# Patient Record
Sex: FEMALE | Race: WHITE | NOT HISPANIC OR LATINO
[De-identification: names, ages, dates, MRNs, and addresses within clinical notes are randomized per-mention and may not be internally consistent; named-entity substitution may affect disease eponyms.]

---

## 2017-05-18 ENCOUNTER — TRANSCRIPTION ENCOUNTER (OUTPATIENT)
Age: 52
End: 2017-05-18

## 2017-06-30 ENCOUNTER — TRANSCRIPTION ENCOUNTER (OUTPATIENT)
Age: 52
End: 2017-06-30

## 2019-04-22 ENCOUNTER — TRANSCRIPTION ENCOUNTER (OUTPATIENT)
Age: 54
End: 2019-04-22

## 2019-07-02 ENCOUNTER — APPOINTMENT (OUTPATIENT)
Dept: UROLOGY | Facility: CLINIC | Age: 54
End: 2019-07-02
Payer: COMMERCIAL

## 2019-07-02 VITALS
SYSTOLIC BLOOD PRESSURE: 130 MMHG | WEIGHT: 285 LBS | DIASTOLIC BLOOD PRESSURE: 81 MMHG | HEIGHT: 66 IN | BODY MASS INDEX: 45.8 KG/M2 | HEART RATE: 91 BPM

## 2019-07-02 PROBLEM — Z00.00 ENCOUNTER FOR PREVENTIVE HEALTH EXAMINATION: Status: ACTIVE | Noted: 2019-07-02

## 2019-07-02 PROCEDURE — 99203 OFFICE O/P NEW LOW 30 MIN: CPT

## 2019-07-02 NOTE — ASSESSMENT
[FreeTextEntry1] : This is an initial visit for this 54-year-old patient who recently on a trip to her son in [facility] developed renal colic and was seen in the emergency room\par She was told that there was no stone despite the severe pain and it was not an emergency visit\par They called back the next to say she indeed had a stone and she has done well but recently had a left-sided attack of some pain 2 weeks after the initial at\par  I. have asked her to obtain a to CAT scan report and a CAT scan itself and will sends aprescription for Toradol to her pharmacy in case there's a repeat attack in addition to her she has urgency and will give her Detrol 4 mg

## 2019-07-02 NOTE — PHYSICAL EXAM
[General Appearance - Well Developed] : well developed [General Appearance - Well Nourished] : well nourished [Normal Appearance] : normal appearance [Well Groomed] : well groomed [Edema] : no peripheral edema [General Appearance - In No Acute Distress] : no acute distress [Respiration, Rhythm And Depth] : normal respiratory rhythm and effort [Abdomen Soft] : soft [Exaggerated Use Of Accessory Muscles For Inspiration] : no accessory muscle use [Abdomen Tenderness] : non-tender [Costovertebral Angle Tenderness] : no ~M costovertebral angle tenderness [Urinary Bladder Findings] : the bladder was normal on palpation [Normal Station and Gait] : the gait and station were normal for the patient's age [No Focal Deficits] : no focal deficits [] : no rash [Affect] : the affect was normal [Oriented To Time, Place, And Person] : oriented to person, place, and time [Mood] : the mood was normal [Not Anxious] : not anxious [No Palpable Adenopathy] : no palpable adenopathy

## 2019-07-10 ENCOUNTER — RX RENEWAL (OUTPATIENT)
Age: 54
End: 2019-07-10

## 2019-07-10 ENCOUNTER — MESSAGE (OUTPATIENT)
Age: 54
End: 2019-07-10

## 2019-08-21 ENCOUNTER — RX CHANGE (OUTPATIENT)
Age: 54
End: 2019-08-21

## 2019-09-03 ENCOUNTER — APPOINTMENT (OUTPATIENT)
Dept: UROLOGY | Facility: CLINIC | Age: 54
End: 2019-09-03
Payer: COMMERCIAL

## 2019-09-03 VITALS
HEIGHT: 66 IN | BODY MASS INDEX: 45.8 KG/M2 | DIASTOLIC BLOOD PRESSURE: 97 MMHG | SYSTOLIC BLOOD PRESSURE: 154 MMHG | WEIGHT: 285 LBS | HEART RATE: 76 BPM

## 2019-09-03 DIAGNOSIS — Z80.3 FAMILY HISTORY OF MALIGNANT NEOPLASM OF BREAST: ICD-10-CM

## 2019-09-03 DIAGNOSIS — Z80.42 FAMILY HISTORY OF MALIGNANT NEOPLASM OF PROSTATE: ICD-10-CM

## 2019-09-03 PROCEDURE — 99213 OFFICE O/P EST LOW 20 MIN: CPT

## 2019-09-03 RX ORDER — TOLTERODINE TARTARATE 2 MG/1
2 TABLET ORAL
Qty: 30 | Refills: 0 | Status: DISCONTINUED | COMMUNITY
Start: 2019-07-10 | End: 2019-09-03

## 2019-09-03 RX ORDER — TOLTERODINE TARTRATE 4 MG/1
4 CAPSULE, EXTENDED RELEASE ORAL
Qty: 90 | Refills: 6 | Status: DISCONTINUED | COMMUNITY
Start: 2019-08-21 | End: 2019-09-03

## 2019-09-03 RX ORDER — TOLTERODINE TARTRATE 4 MG/1
4 CAPSULE, EXTENDED RELEASE ORAL
Qty: 30 | Refills: 0 | Status: DISCONTINUED | COMMUNITY
Start: 2019-07-02 | End: 2019-09-03

## 2019-09-03 NOTE — ASSESSMENT
[FreeTextEntry1] : This is a followup visit for this 54-year-old patient with history of kidney stones and who developed the left colic while taking her son to [facility] in [place]\par She had a CT scan they told her she had a 2 mm stone she didn't quite S. input pain his back associated with low-grade fever and diarrhea\par We'll get a urine culture to rule out UTI as the cause of the fever but the patient says that the last time she had kidney surgery at the same complex of symptoms with diarrhea and low-grade fever

## 2019-09-05 ENCOUNTER — MESSAGE (OUTPATIENT)
Age: 54
End: 2019-09-05

## 2019-09-18 ENCOUNTER — APPOINTMENT (OUTPATIENT)
Dept: UROLOGY | Facility: CLINIC | Age: 54
End: 2019-09-18
Payer: COMMERCIAL

## 2019-09-18 VITALS
BODY MASS INDEX: 45.8 KG/M2 | HEART RATE: 90 BPM | DIASTOLIC BLOOD PRESSURE: 92 MMHG | SYSTOLIC BLOOD PRESSURE: 126 MMHG | WEIGHT: 285 LBS | HEIGHT: 66 IN

## 2019-09-18 PROCEDURE — 76775 US EXAM ABDO BACK WALL LIM: CPT

## 2019-09-18 PROCEDURE — 99214 OFFICE O/P EST MOD 30 MIN: CPT | Mod: 25

## 2019-09-18 NOTE — PHYSICAL EXAM
[General Appearance - Well Developed] : well developed [General Appearance - Well Nourished] : well nourished [Well Groomed] : well groomed [Normal Appearance] : normal appearance [General Appearance - In No Acute Distress] : no acute distress [Abdomen Soft] : soft [Costovertebral Angle Tenderness] : no ~M costovertebral angle tenderness [Urinary Bladder Findings] : the bladder was normal on palpation [Abdomen Tenderness] : non-tender [] : no respiratory distress [Edema] : no peripheral edema [Respiration, Rhythm And Depth] : normal respiratory rhythm and effort [Exaggerated Use Of Accessory Muscles For Inspiration] : no accessory muscle use [Oriented To Time, Place, And Person] : oriented to person, place, and time [Affect] : the affect was normal [Mood] : the mood was normal [Not Anxious] : not anxious [Normal Station and Gait] : the gait and station were normal for the patient's age [No Focal Deficits] : no focal deficits [No Palpable Adenopathy] : no palpable adenopathy

## 2019-09-24 ENCOUNTER — APPOINTMENT (OUTPATIENT)
Dept: UROLOGY | Facility: CLINIC | Age: 54
End: 2019-09-24
Payer: COMMERCIAL

## 2019-09-24 VITALS
HEIGHT: 66 IN | HEART RATE: 81 BPM | SYSTOLIC BLOOD PRESSURE: 133 MMHG | BODY MASS INDEX: 45.8 KG/M2 | DIASTOLIC BLOOD PRESSURE: 90 MMHG | WEIGHT: 285 LBS

## 2019-09-24 PROCEDURE — 52000 CYSTOURETHROSCOPY: CPT

## 2019-09-24 PROCEDURE — 99212 OFFICE O/P EST SF 10 MIN: CPT | Mod: 25

## 2019-09-24 NOTE — ASSESSMENT
[FreeTextEntry1] : This is a followup this 54-year-old patient who developed left flank pain while visiting her son in [place]\par She had an episode of renal clock was seen in the emergency room where CT was done which was cultured a left-sided kidney stone\par On review we agreed that is suggestive of a stone but it was his study with oral contrast making slightly less than optimal she's been symptomatic off and on\par Today she presents with severe colicky attack last night in that she had to urinate all the time and was unable to accept in small amounts she then was perfectly fine until the same thing happened again this\par Voided urine shows microhematuria no evidence of infection\par A renal ultrasound was done showing no hydronephrosis\par I think the presumptive diagnosis is a intramural stone on the left side and we'll get a CAT scan done urgently to assess this hypothesis\par

## 2019-09-24 NOTE — ADDENDUM
[FreeTextEntry1] : PVR\par A transabdominal ultrasound was done collecting images of the bladder in the transverse and longitudinal axis to check a postvoid residual\par Her postvoid residual was low about 40 cc

## 2019-09-30 ENCOUNTER — OTHER (OUTPATIENT)
Age: 54
End: 2019-09-30

## 2019-09-30 ENCOUNTER — APPOINTMENT (OUTPATIENT)
Dept: UROLOGY | Facility: HOSPITAL | Age: 54
End: 2019-09-30

## 2019-10-01 ENCOUNTER — TRANSCRIPTION ENCOUNTER (OUTPATIENT)
Age: 54
End: 2019-10-01

## 2021-08-31 RX ORDER — TOLTERODINE TARTARATE 2 MG/1
2 TABLET ORAL
Qty: 90 | Refills: 3 | Status: DISCONTINUED | COMMUNITY
Start: 2019-08-21 | End: 2021-08-31

## 2021-09-05 ENCOUNTER — TRANSCRIPTION ENCOUNTER (OUTPATIENT)
Age: 56
End: 2021-09-05

## 2021-10-19 ENCOUNTER — APPOINTMENT (OUTPATIENT)
Dept: UROLOGY | Facility: CLINIC | Age: 56
End: 2021-10-19
Payer: COMMERCIAL

## 2021-10-19 VITALS
TEMPERATURE: 97.8 F | RESPIRATION RATE: 18 BRPM | OXYGEN SATURATION: 99 % | SYSTOLIC BLOOD PRESSURE: 147 MMHG | DIASTOLIC BLOOD PRESSURE: 81 MMHG | HEART RATE: 76 BPM

## 2021-10-19 DIAGNOSIS — C95.90 LEUKEMIA, UNSPECIFIED NOT HAVING ACHIEVED REMISSION: ICD-10-CM

## 2021-10-19 PROCEDURE — 99213 OFFICE O/P EST LOW 20 MIN: CPT

## 2021-10-19 NOTE — ASSESSMENT
[FreeTextEntry1] : This is a followup visit for this 56 patient has recurrent nephrolithiasis who is been symptom-free for quite a while but she also suffers from urinary frequency which has been well managed with tolterodine 2 mg\par Avoid urine is clear\par There is no suggestion of infection or or stone therefore simply won't renew her tolterodine and give her routine follow up

## 2022-08-22 RX ORDER — TOLTERODINE TARTRATE 2 MG/1
2 CAPSULE, EXTENDED RELEASE ORAL
Qty: 90 | Refills: 0 | Status: ACTIVE | COMMUNITY
Start: 2019-08-21 | End: 1900-01-01

## 2023-03-13 ENCOUNTER — OFFICE (OUTPATIENT)
Dept: URBAN - METROPOLITAN AREA CLINIC 122 | Facility: CLINIC | Age: 58
Setting detail: OPHTHALMOLOGY
End: 2023-03-13
Payer: COMMERCIAL

## 2023-03-13 DIAGNOSIS — H35.033: ICD-10-CM

## 2023-03-13 DIAGNOSIS — H01.001: ICD-10-CM

## 2023-03-13 DIAGNOSIS — H35.3122: ICD-10-CM

## 2023-03-13 DIAGNOSIS — H16.223: ICD-10-CM

## 2023-03-13 DIAGNOSIS — H35.3111: ICD-10-CM

## 2023-03-13 DIAGNOSIS — H01.004: ICD-10-CM

## 2023-03-13 DIAGNOSIS — H25.13: ICD-10-CM

## 2023-03-13 PROCEDURE — 92134 CPTRZ OPH DX IMG PST SGM RTA: CPT | Performed by: OPHTHALMOLOGY

## 2023-03-13 PROCEDURE — 92014 COMPRE OPH EXAM EST PT 1/>: CPT | Performed by: OPHTHALMOLOGY

## 2023-03-13 ASSESSMENT — SPHEQUIV_DERIVED
OD_SPHEQUIV: -5.25
OS_SPHEQUIV: -6.25
OD_SPHEQUIV: -5.25
OS_SPHEQUIV: -5.75

## 2023-03-13 ASSESSMENT — REFRACTION_CURRENTRX
OD_SPHERE: -4.50
OD_CYLINDER: SPH
OD_OVR_VA: 20/
OS_SPHERE: -4.75
OD_VPRISM_DIRECTION: SV
OS_OVR_VA: 20/
OS_CYLINDER: SPH
OS_VPRISM_DIRECTION: SV

## 2023-03-13 ASSESSMENT — REFRACTION_AUTOREFRACTION
OD_SPHERE: -4.75
OS_CYLINDER: -1.00
OD_CYLINDER: -1.00
OS_SPHERE: -5.25
OD_AXIS: 102
OS_AXIS: 95

## 2023-03-13 ASSESSMENT — TONOMETRY
OD_IOP_MMHG: 15
OS_IOP_MMHG: 16

## 2023-03-13 ASSESSMENT — REFRACTION_MANIFEST
OD_VA1: 20/20
OD_SPHERE: -5.00
OD_SPHERE: PLANO
OS_SPHERE: -5.75
OS_SPHERE: +0.50
OS_AXIS: 95
OS_CYLINDER: -1.00
OD_CYLINDER: -0.50
OS_VA1: 20/25
OD_AXIS: 100
OD_VA1: 20/20
OS_VA1: 20/25

## 2023-03-13 ASSESSMENT — LID EXAM ASSESSMENTS
OD_BLEPHARITIS: RUL 1+
OS_BLEPHARITIS: LUL 1+

## 2023-03-13 ASSESSMENT — CONFRONTATIONAL VISUAL FIELD TEST (CVF)
OS_FINDINGS: FULL
OD_FINDINGS: FULL

## 2023-03-13 ASSESSMENT — VISUAL ACUITY
OS_BCVA: 20/20
OD_BCVA: 20/25+2

## 2023-03-13 ASSESSMENT — OVER_REFRACTION
OS_SPHERE: PLANO
OD_SPHERE: PLANO

## 2023-03-13 ASSESSMENT — TEAR BREAK UP TIME (TBUT)
OS_TBUT: 2+
OD_TBUT: 2+

## 2024-01-08 ENCOUNTER — OFFICE (OUTPATIENT)
Dept: URBAN - METROPOLITAN AREA CLINIC 122 | Facility: CLINIC | Age: 59
Setting detail: OPHTHALMOLOGY
End: 2024-01-08
Payer: COMMERCIAL

## 2024-01-08 DIAGNOSIS — H35.3122: ICD-10-CM

## 2024-01-08 DIAGNOSIS — H35.3111: ICD-10-CM

## 2024-01-08 DIAGNOSIS — H01.004: ICD-10-CM

## 2024-01-08 DIAGNOSIS — H35.033: ICD-10-CM

## 2024-01-08 DIAGNOSIS — H25.13: ICD-10-CM

## 2024-01-08 DIAGNOSIS — H16.223: ICD-10-CM

## 2024-01-08 DIAGNOSIS — H01.001: ICD-10-CM

## 2024-01-08 PROCEDURE — 92012 INTRM OPH EXAM EST PATIENT: CPT | Performed by: OPHTHALMOLOGY

## 2024-01-08 PROCEDURE — 92134 CPTRZ OPH DX IMG PST SGM RTA: CPT | Performed by: OPHTHALMOLOGY

## 2024-01-08 ASSESSMENT — REFRACTION_CURRENTRX
OS_VPRISM_DIRECTION: SV
OD_CYLINDER: SPH
OS_SPHERE: -4.75
OD_VPRISM_DIRECTION: SV
OD_SPHERE: -4.50
OS_OVR_VA: 20/
OD_OVR_VA: 20/
OS_CYLINDER: SPH

## 2024-01-08 ASSESSMENT — REFRACTION_MANIFEST
OS_VA1: 20/25
OD_AXIS: 100
OD_CYLINDER: -1.25
OD_VA1: 20/20
OS_AXIS: 95
OD_SPHERE: -5.00
OD_SPHERE: -4.25
OS_AXIS: 90
OD_VA1: 20/20
OD_VA1: 20/20
OS_SPHERE: -5.50
OD_AXIS: 95
OS_VA1: 20/25
OS_VA1: 20/25
OD_ADD: +2.50
OS_SPHERE: +0.50
OD_SPHERE: PLANO
OS_CYLINDER: -1.25
OS_CYLINDER: -1.00
OS_SPHERE: -5.75
OD_CYLINDER: -0.50
OS_ADD: +2.50

## 2024-01-08 ASSESSMENT — LID EXAM ASSESSMENTS
OD_BLEPHARITIS: RUL 1+
OS_BLEPHARITIS: LUL 1+

## 2024-01-08 ASSESSMENT — TEAR BREAK UP TIME (TBUT)
OD_TBUT: 2+
OS_TBUT: 2+

## 2024-01-08 ASSESSMENT — SPHEQUIV_DERIVED
OD_SPHEQUIV: -4.875
OD_SPHEQUIV: -5.25
OS_SPHEQUIV: -5.75
OS_SPHEQUIV: -6.25
OD_SPHEQUIV: -4.875
OS_SPHEQUIV: -6.125

## 2024-01-08 ASSESSMENT — OVER_REFRACTION
OD_VA1: 20/20
OS_SPHERE: PLANO
OS_VA1: 20/30+
OD_SPHERE: PLANO
OD_SPHERE: +0.50
OS_SPHERE: -0.25

## 2024-01-08 ASSESSMENT — REFRACTION_AUTOREFRACTION
OD_AXIS: 101
OS_SPHERE: -5.00
OS_CYLINDER: -1.50
OD_SPHERE: -4.25
OS_AXIS: 95
OD_CYLINDER: -1.25

## 2024-01-25 ENCOUNTER — OFFICE (OUTPATIENT)
Dept: URBAN - METROPOLITAN AREA CLINIC 122 | Facility: CLINIC | Age: 59
Setting detail: OPHTHALMOLOGY
End: 2024-01-25

## 2024-01-25 DIAGNOSIS — H52.7: ICD-10-CM

## 2024-01-25 PROCEDURE — CLEST CL LENS FIT ESTABLISHED WEARER FITTING ONLY

## 2024-01-25 ASSESSMENT — OVER_REFRACTION
OS_SPHERE: PLANO
OD_SPHERE: PLANO
OS_VA1: 20/30+
OD_VA1: 20/20
OS_SPHERE: -0.25
OD_SPHERE: +0.50

## 2024-01-25 ASSESSMENT — REFRACTION_AUTOREFRACTION
OD_SPHERE: -4.75
OD_CYLINDER: -1.25
OS_AXIS: 98
OD_AXIS: 100
OS_CYLINDER: -1.25
OS_SPHERE: -5.25

## 2024-01-25 ASSESSMENT — REFRACTION_CURRENTRX
OD_CYLINDER: SPH
OS_VPRISM_DIRECTION: SV
OD_OVR_VA: 20/
OD_SPHERE: -4.50
OS_SPHERE: -4.75
OS_CYLINDER: SPH
OS_OVR_VA: 20/
OD_VPRISM_DIRECTION: SV

## 2024-01-25 ASSESSMENT — REFRACTION_MANIFEST
OS_AXIS: 95
OS_VA1: 20/25
OD_CYLINDER: -1.25
OD_VA1: 20/20
OS_VA1: 20/25
OS_ADD: +2.50
OS_SPHERE: +0.50
OS_ADD: +2.50
OS_SPHERE: -5.75
OD_SPHERE: PLANO
OS_VA1: 20/20
OD_VA1: 20/20
OD_AXIS: 95
OS_AXIS: 100
OD_SPHERE: -4.25
OD_ADD: +2.50
OD_AXIS: 95
OD_AXIS: 100
OD_SPHERE: -4.50
OS_SPHERE: -5.75
OD_SPHERE: -5.00
OS_AXIS: 90
OD_CYLINDER: -0.50
OD_ADD: +2.50
OD_VA1: 20/20
OD_CYLINDER: -1.25
OS_CYLINDER: -1.25
OS_CYLINDER: -1.25
OS_VA1: 20/25
OS_CYLINDER: -1.00
OD_VA1: 20/20
OS_SPHERE: -5.50

## 2024-01-25 ASSESSMENT — SPHEQUIV_DERIVED
OS_SPHEQUIV: -6.375
OD_SPHEQUIV: -5.375
OS_SPHEQUIV: -6.125
OD_SPHEQUIV: -5.25
OS_SPHEQUIV: -5.875
OD_SPHEQUIV: -5.125
OS_SPHEQUIV: -6.25
OD_SPHEQUIV: -4.875

## 2024-01-25 ASSESSMENT — CONFRONTATIONAL VISUAL FIELD TEST (CVF)
OS_FINDINGS: FULL
OD_FINDINGS: FULL

## 2024-02-01 ENCOUNTER — OFFICE (OUTPATIENT)
Dept: URBAN - METROPOLITAN AREA CLINIC 122 | Facility: CLINIC | Age: 59
Setting detail: OPHTHALMOLOGY
End: 2024-02-01
Payer: COMMERCIAL

## 2024-02-01 DIAGNOSIS — H52.7: ICD-10-CM

## 2024-02-01 PROCEDURE — 401 NO CHARGE VISIT

## 2024-02-01 ASSESSMENT — SPHEQUIV_DERIVED
OS_SPHEQUIV: -6.25
OS_SPHEQUIV: -5.875
OS_SPHEQUIV: -6.375
OD_SPHEQUIV: -4.875
OD_SPHEQUIV: -5.125
OD_SPHEQUIV: -5.375
OD_SPHEQUIV: -5.25
OS_SPHEQUIV: -6.125

## 2024-02-01 ASSESSMENT — REFRACTION_MANIFEST
OS_SPHERE: -5.75
OD_VA1: 20/20
OD_ADD: +2.50
OS_VA1: 20/20
OD_SPHERE: -5.00
OD_VA1: 20/20
OS_ADD: +2.50
OD_SPHERE: -4.25
OD_AXIS: 100
OS_VA1: 20/25
OS_SPHERE: +0.50
OS_AXIS: 95
OD_CYLINDER: -1.25
OD_VA1: 20/20
OS_CYLINDER: -1.00
OD_CYLINDER: -1.25
OS_VA1: 20/25
OD_SPHERE: PLANO
OD_ADD: +2.50
OS_AXIS: 100
OS_CYLINDER: -1.25
OD_AXIS: 95
OS_ADD: +2.50
OD_CYLINDER: -0.50
OS_CYLINDER: -1.25
OD_SPHERE: -4.50
OS_SPHERE: -5.50
OS_AXIS: 90
OD_AXIS: 95
OS_VA1: 20/25
OS_SPHERE: -5.75
OD_VA1: 20/20

## 2024-02-01 ASSESSMENT — OVER_REFRACTION
OD_VA1: 20/20
OS_SPHERE: -0.25
OD_SPHERE: PLANO
OS_SPHERE: PLANO
OD_SPHERE: +0.50
OS_VA1: 20/30+

## 2024-02-01 ASSESSMENT — REFRACTION_CURRENTRX
OD_VPRISM_DIRECTION: SV
OS_VPRISM_DIRECTION: SV
OS_CYLINDER: SPH
OS_SPHERE: -4.75
OD_CYLINDER: SPH
OD_SPHERE: -4.50
OD_OVR_VA: 20/
OS_OVR_VA: 20/

## 2024-02-01 ASSESSMENT — REFRACTION_AUTOREFRACTION
OD_AXIS: 100
OS_SPHERE: -5.25
OD_CYLINDER: -1.25
OS_AXIS: 98
OS_CYLINDER: -1.25
OD_SPHERE: -4.75

## 2024-02-05 ENCOUNTER — APPOINTMENT (OUTPATIENT)
Dept: UROLOGY | Facility: CLINIC | Age: 59
End: 2024-02-05
Payer: COMMERCIAL

## 2024-02-05 VITALS
SYSTOLIC BLOOD PRESSURE: 126 MMHG | HEART RATE: 108 BPM | WEIGHT: 262 LBS | DIASTOLIC BLOOD PRESSURE: 84 MMHG | HEIGHT: 66 IN | BODY MASS INDEX: 42.11 KG/M2

## 2024-02-05 DIAGNOSIS — N20.0 CALCULUS OF KIDNEY: ICD-10-CM

## 2024-02-05 DIAGNOSIS — R35.0 FREQUENCY OF MICTURITION: ICD-10-CM

## 2024-02-05 DIAGNOSIS — R31.0 GROSS HEMATURIA: ICD-10-CM

## 2024-02-05 DIAGNOSIS — N23 UNSPECIFIED RENAL COLIC: ICD-10-CM

## 2024-02-05 PROCEDURE — 99214 OFFICE O/P EST MOD 30 MIN: CPT

## 2024-02-05 RX ORDER — TOLTERODINE TARTRATE 4 MG/1
4 CAPSULE, EXTENDED RELEASE ORAL
Qty: 90 | Refills: 6 | Status: ACTIVE | COMMUNITY
Start: 2022-11-22 | End: 1900-01-01

## 2024-02-05 RX ORDER — LEVOTHYROXINE SODIUM 0.17 MG/1
TABLET ORAL
Refills: 0 | Status: ACTIVE | COMMUNITY

## 2024-02-05 RX ORDER — FAMOTIDINE 40 MG
TABLET,DISINTEGRATING ORAL
Refills: 0 | Status: ACTIVE | COMMUNITY

## 2024-02-05 RX ORDER — KETOROLAC TROMETHAMINE 10 MG/1
10 TABLET, FILM COATED ORAL
Qty: 20 | Refills: 0 | Status: DISCONTINUED | COMMUNITY
Start: 2019-07-02 | End: 2024-02-05

## 2024-02-05 NOTE — ASSESSMENT
[FreeTextEntry1] : Patient is a 58-year-old male here to establish care with me.  She has seen Dr. Hoffman for many years due to recurrent kidney stones and overactive bladder.  She has had no interval gross hematuria,, dysuria but does feel she passed a kidney stone back in October 2023 during a hospitalization for acute gallbladder.  She was not able to save the stone.  She states they did do an MRI of the abdomen and ultrasound of the abdomen at the hospital and I will get those results.  In addition, she has overactive bladder and is on Detrol 2 mg p.o. daily with good results.  Assessment and plan 1.  Recurrent kidney stones with likely passed stone back in October 2023.  I will get her ultrasound and MRI report to review.  I again discussed maintaining good hydration and told her wanting to maintain a urine output of 2-2 and half liters a day. 3.  Overactive bladder Will refill the Detrol ER 2 mg p.o. daily.  I did discuss the risk of cognitive decline with anticholinergics in older individuals for her to be aware of.  I did discuss other options including switching to Gemtesa, Botox injection and PTNS in the office.  She will consider.  I will see her back in 1 year.  Greater than 35 minutes was spent in review of the chart and in direct discussion with the patient.

## 2024-02-05 NOTE — PHYSICAL EXAM
[General Appearance - Well Developed] : well developed [Normal Appearance] : normal appearance [General Appearance - In No Acute Distress] : no acute distress [Respiration, Rhythm And Depth] : normal respiratory rhythm and effort [] : no rash [Oriented To Time, Place, And Person] : oriented to person, place, and time

## 2024-02-19 ENCOUNTER — OFFICE (OUTPATIENT)
Dept: URBAN - METROPOLITAN AREA CLINIC 122 | Facility: CLINIC | Age: 59
Setting detail: OPHTHALMOLOGY
End: 2024-02-19
Payer: COMMERCIAL

## 2024-02-19 DIAGNOSIS — H52.7: ICD-10-CM

## 2024-02-19 PROCEDURE — 401 NO CHARGE VISIT

## 2024-02-19 ASSESSMENT — REFRACTION_CURRENTRX
OD_OVR_VA: 20/
OD_SPHERE: -4.50
OS_VPRISM_DIRECTION: SV
OD_VPRISM_DIRECTION: SV
OD_CYLINDER: SPH
OS_OVR_VA: 20/
OS_CYLINDER: SPH
OS_SPHERE: -4.75

## 2024-02-19 ASSESSMENT — REFRACTION_MANIFEST
OD_ADD: +2.50
OS_VA1: 20/25
OD_VA1: 20/20
OS_VA1: 20/25
OD_ADD: +2.50
OS_AXIS: 95
OD_VA1: 20/20
OD_VA1: 20/20
OD_SPHERE: -5.00
OS_SPHERE: -5.50
OS_CYLINDER: -1.00
OS_SPHERE: +0.50
OS_AXIS: 100
OS_ADD: +2.50
OD_AXIS: 95
OS_VA1: 20/20
OD_SPHERE: -4.25
OD_SPHERE: PLANO
OD_SPHERE: -4.50
OS_ADD: +2.50
OD_AXIS: 95
OD_VA1: 20/20
OD_CYLINDER: -1.25
OS_CYLINDER: -1.25
OS_VA1: 20/25
OS_SPHERE: -5.75
OS_CYLINDER: -1.25
OD_CYLINDER: -0.50
OD_CYLINDER: -1.25
OD_AXIS: 100
OS_SPHERE: -5.75
OS_AXIS: 90

## 2024-02-19 ASSESSMENT — REFRACTION_AUTOREFRACTION
OS_CYLINDER: -1.25
OD_AXIS: 100
OS_SPHERE: -5.25
OS_AXIS: 98
OD_SPHERE: -4.75
OD_CYLINDER: -1.25

## 2024-02-19 ASSESSMENT — SPHEQUIV_DERIVED
OD_SPHEQUIV: -5.375
OD_SPHEQUIV: -5.125
OD_SPHEQUIV: -5.25
OS_SPHEQUIV: -6.375
OS_SPHEQUIV: -6.125
OD_SPHEQUIV: -4.875
OS_SPHEQUIV: -5.875
OS_SPHEQUIV: -6.25

## 2024-02-19 ASSESSMENT — OVER_REFRACTION
OS_SPHERE: PLANO
OD_SPHERE: PLANO
OS_VA1: 20/30+
OD_SPHERE: +0.50
OS_SPHERE: -0.25
OD_VA1: 20/20

## 2024-04-16 ENCOUNTER — OFFICE (OUTPATIENT)
Dept: URBAN - METROPOLITAN AREA CLINIC 122 | Facility: CLINIC | Age: 59
Setting detail: OPHTHALMOLOGY
End: 2024-04-16
Payer: COMMERCIAL

## 2024-04-16 DIAGNOSIS — H16.223: ICD-10-CM

## 2024-04-16 PROCEDURE — 92012 INTRM OPH EXAM EST PATIENT: CPT

## 2024-04-16 ASSESSMENT — LID EXAM ASSESSMENTS
OD_BLEPHARITIS: RUL 1+
OS_BLEPHARITIS: LUL 1+

## 2024-10-23 ENCOUNTER — OFFICE (OUTPATIENT)
Dept: URBAN - METROPOLITAN AREA CLINIC 122 | Facility: CLINIC | Age: 59
Setting detail: OPHTHALMOLOGY
End: 2024-10-23
Payer: COMMERCIAL

## 2024-10-23 DIAGNOSIS — H52.7: ICD-10-CM

## 2024-10-23 PROCEDURE — RX/CHECK RX/CHECK

## 2024-10-23 ASSESSMENT — REFRACTION_MANIFEST
OD_SPHERE: -4.25
OS_SPHERE: -5.75
OD_ADD: +2.50
OS_CYLINDER: -1.25
OD_CYLINDER: -0.50
OS_VA1: 20/25
OD_VA1: 20/20
OS_VA1: 20/25
OD_SPHERE: -5.00
OD_AXIS: 110
OS_SPHERE: -5.75
OS_CYLINDER: -1.25
OS_AXIS: 95
OS_VA1: 20/20
OS_AXIS: 100
OS_ADD: +2.50
OD_AXIS: 100
OS_AXIS: 90
OD_VA1: 20/20
OD_VA1: 20/20
OD_ADD: +2.50
OD_SPHERE: PLANO
OD_CYLINDER: -1.25
OD_SPHERE: -5.00
OD_AXIS: 95
OS_AXIS: 100
OD_AXIS: 95
OD_CYLINDER: -1.25
OS_SPHERE: -5.50
OS_CYLINDER: -1.00
OS_SPHERE: -5.75
OS_SPHERE: +0.50
OD_VA1: 20/20
OS_ADD: +2.50
OD_SPHERE: -4.50
OD_ADD: +2.50
OS_VA1: 20/25
OS_CYLINDER: -1.25
OS_VA1: 20/25
OD_CYLINDER: -1.25
OS_ADD: +2.50
OD_VA1: 20/20

## 2024-10-23 ASSESSMENT — REFRACTION_CURRENTRX
OD_SPHERE: -4.50
OD_VPRISM_DIRECTION: SV
OS_CYLINDER: SPH
OD_CYLINDER: SPH
OD_OVR_VA: 20/
OS_VPRISM_DIRECTION: SV
OS_OVR_VA: 20/
OS_SPHERE: -4.75

## 2024-10-23 ASSESSMENT — TONOMETRY
OS_IOP_MMHG: 15
OD_IOP_MMHG: 13

## 2024-10-23 ASSESSMENT — KERATOMETRY
OD_AXISANGLE_DEGREES: 31
OS_AXISANGLE_DEGREES: 90
OD_K2POWER_DIOPTERS: 44.25
OD_K1POWER_DIOPTERS: 43.75
OS_K1POWER_DIOPTERS: 44.75
OS_K2POWER_DIOPTERS: 44.75
METHOD_AUTO_MANUAL: AUTO

## 2024-10-23 ASSESSMENT — OVER_REFRACTION
OS_SPHERE: PLANO
OD_VA1: 20/20
OS_SPHERE: -0.25
OD_SPHERE: PLANO
OS_VA1: 20/30+
OD_SPHERE: +0.50

## 2024-10-23 ASSESSMENT — VISUAL ACUITY
OS_BCVA: 20/20
OD_BCVA: 20/30+

## 2024-10-23 ASSESSMENT — REFRACTION_AUTOREFRACTION
OS_AXIS: 96
OD_SPHERE: -5.00
OD_CYLINDER: -1.25
OD_AXIS: 103
OS_CYLINDER: -1.25
OS_SPHERE: -5.75